# Patient Record
Sex: FEMALE | Race: WHITE | Employment: FULL TIME | URBAN - NONMETROPOLITAN AREA
[De-identification: names, ages, dates, MRNs, and addresses within clinical notes are randomized per-mention and may not be internally consistent; named-entity substitution may affect disease eponyms.]

---

## 2024-02-01 ENCOUNTER — HOSPITAL ENCOUNTER (EMERGENCY)
Age: 24
Discharge: HOME | End: 2024-02-01
Payer: COMMERCIAL

## 2024-02-01 VITALS
RESPIRATION RATE: 16 BRPM | BODY MASS INDEX: 28.52 KG/M2 | TEMPERATURE: 98.6 F | HEART RATE: 79 BPM | DIASTOLIC BLOOD PRESSURE: 77 MMHG | OXYGEN SATURATION: 100 % | HEIGHT: 62 IN | WEIGHT: 155 LBS | SYSTOLIC BLOOD PRESSURE: 132 MMHG

## 2024-02-01 DIAGNOSIS — H10.9 CONJUNCTIVITIS OF RIGHT EYE, UNSPECIFIED CONJUNCTIVITIS TYPE: Primary | ICD-10-CM

## 2024-02-01 PROCEDURE — 99203 OFFICE O/P NEW LOW 30 MIN: CPT

## 2024-02-01 RX ORDER — POLYMYXIN B SULFATE AND TRIMETHOPRIM 1; 10000 MG/ML; [USP'U]/ML
1 SOLUTION OPHTHALMIC EVERY 4 HOURS
Qty: 2 ML | Refills: 0 | Status: SHIPPED | OUTPATIENT
Start: 2024-02-01 | End: 2024-02-06

## 2024-02-01 ASSESSMENT — PAIN - FUNCTIONAL ASSESSMENT: PAIN_FUNCTIONAL_ASSESSMENT: NONE - DENIES PAIN

## 2024-02-01 NOTE — DISCHARGE INSTRUCTIONS
Polytrim eyedrops as directed until symptoms completely resolved x 24 hours    Good handwashing before and after touching anywhere near your eye    Wash your pillow cover so not to reexpose    Return for new or worsening symptoms or if symptoms do not improve in 3 days

## 2024-02-01 NOTE — ED NOTES
PT GIVEN DISCHARGE INSTRUCTIONS, VERBALIZES UNDERSTANDING.  PT ASSESSMENT UNCHANGED, DISCHARGED IN STABLE CONDITION.        James Almanza RN  02/01/24 [id number]

## 2024-02-07 ASSESSMENT — ENCOUNTER SYMPTOMS
RHINORRHEA: 0
EYE REDNESS: 1
EYE DISCHARGE: 1
EYE PAIN: 1

## 2024-02-07 ASSESSMENT — VISUAL ACUITY: OU: 1
